# Patient Record
Sex: FEMALE | Race: WHITE | NOT HISPANIC OR LATINO | URBAN - METROPOLITAN AREA
[De-identification: names, ages, dates, MRNs, and addresses within clinical notes are randomized per-mention and may not be internally consistent; named-entity substitution may affect disease eponyms.]

---

## 2018-10-08 ENCOUNTER — OFFICE VISIT (OUTPATIENT)
Dept: ORTHOPEDICS | Facility: CLINIC | Age: 68
End: 2018-10-08
Attending: ORTHOPAEDIC SURGERY
Payer: MEDICARE

## 2018-10-08 ENCOUNTER — HOSPITAL ENCOUNTER (OUTPATIENT)
Dept: RADIOLOGY | Facility: HOSPITAL | Age: 68
Discharge: HOME | End: 2018-10-08
Attending: ORTHOPAEDIC SURGERY
Payer: MEDICARE

## 2018-10-08 VITALS — HEIGHT: 66 IN | TEMPERATURE: 97.6 F | BODY MASS INDEX: 32.95 KG/M2 | WEIGHT: 205 LBS

## 2018-10-08 DIAGNOSIS — Z96.651 HISTORY OF TOTAL RIGHT KNEE REPLACEMENT: ICD-10-CM

## 2018-10-08 DIAGNOSIS — Z96.651 HISTORY OF TOTAL RIGHT KNEE REPLACEMENT: Primary | ICD-10-CM

## 2018-10-08 PROCEDURE — 99213 OFFICE O/P EST LOW 20 MIN: CPT | Performed by: ORTHOPAEDIC SURGERY

## 2018-10-08 PROCEDURE — 73564 X-RAY EXAM KNEE 4 OR MORE: CPT | Mod: RT

## 2018-10-08 RX ORDER — ASPIRIN 81 MG/1
81 TABLET ORAL
COMMUNITY
Start: 2014-06-03

## 2018-10-08 RX ORDER — NIACIN 1000 MG/1
TABLET, EXTENDED RELEASE ORAL
COMMUNITY
Start: 2014-06-03

## 2018-10-08 RX ORDER — SIMVASTATIN 10 MG/1
10 TABLET, FILM COATED ORAL
COMMUNITY
Start: 2014-06-03

## 2018-10-08 NOTE — PROGRESS NOTES
Subjective   Patient ID: Ibis Ribeiro is a 67 y.o. female.    Chief Complaint:  Chief Complaint   Patient presents with   • Follow-up     right TKR 9/13/18       History of Present Illness:  Patient returns.  Patient presents today one year status post right total knee replacement.  She is doing well.  She is ambulating without any assistive devices.  Pleased with the results.  Not taking pain medication.  She is here for her one-year clinical and radiographic follow-up.  No numbness tingling weakness down the legs.  No fevers chills or night sweats.  She is very pleased with the results of the right knee replacement.    Review of Systems:    Unremarkable with the exception of the aforementioned musculoskeletal complaints.    Physical Examination:  Alert and oriented times 3 in no apparent distress  5 feet 6 inches 205 pounds B was 33.1 temperature in the office is 97.6.    Right lower extremity able to dorsiflex and plantarflex her foot.  Good EHL strength.  Sensation intact light touch.  Capillary refill is less than 2 seconds.  Calf soft nontender negative Homans sign.    No pain with internal or external rotation of the right hip.  Negative straight leg raise.  Negative femoral nerve stretch test.    She has full extension on the right knee.  She flexes to 125.  Stable to varus valgus stress at 0 and 30 degrees.  The incision is clean dry and intact.  No erythema fluctuance or drainage to suggest infection.    Radiographs:  All images were personally reviewed.    Radiographs of the right knee reviewed show a well-positioned prosthesis.  No signs of loosening lysis or wear.    Assessment and Plan:  Status post right total knee replacement 1 year.    She is doing well.    As always we discussed infectious prophylaxis this is for life.    I will see her back in 2 years we will get x-rays of both knees at that time.  Any issues or concerns we will see her back sooner.    Abel Austin MD

## 2021-04-08 DIAGNOSIS — Z23 ENCOUNTER FOR IMMUNIZATION: ICD-10-CM
